# Patient Record
Sex: MALE | Race: OTHER
[De-identification: names, ages, dates, MRNs, and addresses within clinical notes are randomized per-mention and may not be internally consistent; named-entity substitution may affect disease eponyms.]

---

## 2022-08-23 ENCOUNTER — HOSPITAL ENCOUNTER (INPATIENT)
Dept: HOSPITAL 97 - ER | Age: 87
LOS: 2 days | Discharge: HOSPICE | DRG: 871 | End: 2022-08-25
Attending: INTERNAL MEDICINE | Admitting: INTERNAL MEDICINE
Payer: COMMERCIAL

## 2022-08-23 VITALS — BODY MASS INDEX: 23.5 KG/M2

## 2022-08-23 DIAGNOSIS — Z79.02: ICD-10-CM

## 2022-08-23 DIAGNOSIS — G20: ICD-10-CM

## 2022-08-23 DIAGNOSIS — A41.9: Primary | ICD-10-CM

## 2022-08-23 DIAGNOSIS — Z95.1: ICD-10-CM

## 2022-08-23 DIAGNOSIS — G30.9: ICD-10-CM

## 2022-08-23 DIAGNOSIS — F02.80: ICD-10-CM

## 2022-08-23 DIAGNOSIS — Z79.82: ICD-10-CM

## 2022-08-23 DIAGNOSIS — E78.5: ICD-10-CM

## 2022-08-23 DIAGNOSIS — Z79.899: ICD-10-CM

## 2022-08-23 DIAGNOSIS — I25.9: ICD-10-CM

## 2022-08-23 DIAGNOSIS — R79.89: ICD-10-CM

## 2022-08-23 DIAGNOSIS — I25.2: ICD-10-CM

## 2022-08-23 DIAGNOSIS — Z79.890: ICD-10-CM

## 2022-08-23 DIAGNOSIS — R65.21: ICD-10-CM

## 2022-08-23 DIAGNOSIS — Z20.822: ICD-10-CM

## 2022-08-23 DIAGNOSIS — Z51.5: ICD-10-CM

## 2022-08-23 DIAGNOSIS — Z95.5: ICD-10-CM

## 2022-08-23 DIAGNOSIS — J15.9: ICD-10-CM

## 2022-08-23 LAB
ALBUMIN SERPL BCP-MCNC: 3.1 G/DL (ref 3.4–5)
ALP SERPL-CCNC: 47 U/L (ref 45–117)
ALT SERPL W P-5'-P-CCNC: 15 U/L (ref 12–78)
AST SERPL W P-5'-P-CCNC: 19 U/L (ref 15–37)
BUN BLD-MCNC: 24 MG/DL (ref 7–18)
GLUCOSE SERPLBLD-MCNC: 161 MG/DL (ref 74–106)
HCT VFR BLD CALC: 39.6 % (ref 39.6–49)
INR BLD: 1.04
LYMPHOCYTES # SPEC AUTO: 0.5 K/UL (ref 0.7–4.9)
MAGNESIUM SERPL-MCNC: 1.8 MG/DL (ref 1.8–2.4)
MCV RBC: 91.1 FL (ref 80–100)
NT-PROBNP SERPL-MCNC: 344 PG/ML (ref ?–450)
PMV BLD: 8 FL (ref 7.6–11.3)
POTASSIUM SERPL-SCNC: 4.2 MMOL/L (ref 3.5–5.1)
RBC # BLD: 4.34 M/UL (ref 4.33–5.43)
TROPONIN I SERPL HS-MCNC: 378.3 PG/ML (ref ?–58.9)

## 2022-08-23 PROCEDURE — 87040 BLOOD CULTURE FOR BACTERIA: CPT

## 2022-08-23 PROCEDURE — 99284 EMERGENCY DEPT VISIT MOD MDM: CPT

## 2022-08-23 PROCEDURE — 80076 HEPATIC FUNCTION PANEL: CPT

## 2022-08-23 PROCEDURE — 83605 ASSAY OF LACTIC ACID: CPT

## 2022-08-23 PROCEDURE — 93306 TTE W/DOPPLER COMPLETE: CPT

## 2022-08-23 PROCEDURE — 96361 HYDRATE IV INFUSION ADD-ON: CPT

## 2022-08-23 PROCEDURE — 81003 URINALYSIS AUTO W/O SCOPE: CPT

## 2022-08-23 PROCEDURE — 71045 X-RAY EXAM CHEST 1 VIEW: CPT

## 2022-08-23 PROCEDURE — 87804 INFLUENZA ASSAY W/OPTIC: CPT

## 2022-08-23 PROCEDURE — 87086 URINE CULTURE/COLONY COUNT: CPT

## 2022-08-23 PROCEDURE — 36415 COLL VENOUS BLD VENIPUNCTURE: CPT

## 2022-08-23 PROCEDURE — 80048 BASIC METABOLIC PNL TOTAL CA: CPT

## 2022-08-23 PROCEDURE — 94640 AIRWAY INHALATION TREATMENT: CPT

## 2022-08-23 PROCEDURE — 85610 PROTHROMBIN TIME: CPT

## 2022-08-23 PROCEDURE — 97161 PT EVAL LOW COMPLEX 20 MIN: CPT

## 2022-08-23 PROCEDURE — 94760 N-INVAS EAR/PLS OXIMETRY 1: CPT

## 2022-08-23 PROCEDURE — 85025 COMPLETE CBC W/AUTO DIFF WBC: CPT

## 2022-08-23 PROCEDURE — 96375 TX/PRO/DX INJ NEW DRUG ADDON: CPT

## 2022-08-23 PROCEDURE — 97116 GAIT TRAINING THERAPY: CPT

## 2022-08-23 PROCEDURE — 84443 ASSAY THYROID STIM HORMONE: CPT

## 2022-08-23 PROCEDURE — 87811 SARS-COV-2 COVID19 W/OPTIC: CPT

## 2022-08-23 PROCEDURE — 96365 THER/PROPH/DIAG IV INF INIT: CPT

## 2022-08-23 PROCEDURE — 93005 ELECTROCARDIOGRAM TRACING: CPT

## 2022-08-23 PROCEDURE — 84145 PROCALCITONIN (PCT): CPT

## 2022-08-23 PROCEDURE — 97530 THERAPEUTIC ACTIVITIES: CPT

## 2022-08-23 PROCEDURE — 83735 ASSAY OF MAGNESIUM: CPT

## 2022-08-23 PROCEDURE — 83880 ASSAY OF NATRIURETIC PEPTIDE: CPT

## 2022-08-23 PROCEDURE — 84484 ASSAY OF TROPONIN QUANT: CPT

## 2022-08-23 PROCEDURE — 87088 URINE BACTERIA CULTURE: CPT

## 2022-08-23 PROCEDURE — 96366 THER/PROPH/DIAG IV INF ADDON: CPT

## 2022-08-23 RX ADMIN — Medication SCH ML: at 20:29

## 2022-08-23 RX ADMIN — PYRIDOSTIGMINE BROMIDE SCH MG: 60 TABLET ORAL at 20:29

## 2022-08-23 RX ADMIN — CEFTRIAXONE SCH MLS: 1 INJECTION, POWDER, FOR SOLUTION INTRAMUSCULAR; INTRAVENOUS at 17:12

## 2022-08-23 RX ADMIN — DONEPEZIL HYDROCHLORIDE SCH MG: 5 TABLET ORAL at 09:29

## 2022-08-23 RX ADMIN — ASPIRIN SCH MG: 81 TABLET, COATED ORAL at 09:29

## 2022-08-23 RX ADMIN — SODIUM CHLORIDE SCH MLS: 0.9 INJECTION, SOLUTION INTRAVENOUS at 09:29

## 2022-08-23 RX ADMIN — ROSUVASTATIN CALCIUM SCH MG: 10 TABLET, COATED ORAL at 20:28

## 2022-08-23 RX ADMIN — PYRIDOSTIGMINE BROMIDE SCH MG: 60 TABLET ORAL at 09:29

## 2022-08-23 RX ADMIN — Medication SCH ML: at 09:29

## 2022-08-23 RX ADMIN — IPRATROPIUM BROMIDE SCH MG: 0.5 SOLUTION RESPIRATORY (INHALATION) at 20:50

## 2022-08-23 RX ADMIN — DONEPEZIL HYDROCHLORIDE SCH MG: 5 TABLET ORAL at 20:29

## 2022-08-23 RX ADMIN — SODIUM CHLORIDE SCH MLS: 0.9 INJECTION, SOLUTION INTRAVENOUS at 13:35

## 2022-08-23 RX ADMIN — QUETIAPINE SCH MG: 25 TABLET ORAL at 20:28

## 2022-08-23 RX ADMIN — ENOXAPARIN SODIUM SCH MG: 40 INJECTION SUBCUTANEOUS at 09:29

## 2022-08-23 RX ADMIN — LEVOTHYROXINE SODIUM SCH MG: 0.05 TABLET ORAL at 09:45

## 2022-08-23 RX ADMIN — IPRATROPIUM BROMIDE SCH MG: 0.5 SOLUTION RESPIRATORY (INHALATION) at 16:05

## 2022-08-23 NOTE — EDPHYS
Physician Documentation                                                                           

 [facility]                                                                 

Name: Liang Jacobs                                                                            

Age: 89 yrs                                                                                       

Sex: Male                                                                                         

: 1933                                                                                   

MRN: [id number]                                                                                   

Arrival Date: 2022                                                                          

Time: 04:30                                                                                       

Account#: [id number]                                                                            

Bed 4                                                                                             

Private MD:                                                                                       

ED Physician Michael Ngo                                                                      

HPI:                                                                                              

                                                                                             

05:11 This 89 yrs old Other Male presents to ER via Wheelchair with complaints of Shortness   carloz 

      Of Breath, Low Blood Pressure.                                                              

05:11 The patient has shortness of breath at rest, with light activity. Onset: The            carloz 

      symptoms/episode began/occurred 2 day(s) ago. Duration: The symptoms are continuous,        

      and are steadily getting worse. The patient's shortness of breath is aggravated by          

      coughing, light activity, supine position. Associated signs and symptoms: Pertinent         

      positives:. Severity of symptoms: At their worst the symptoms were mild in the              

      emergency department the symptoms are unchanged. The patient has experienced similar        

      episodes in the past, a few times.                                                          

                                                                                                  

Historical:                                                                                       

- Allergies:                                                                                      

04:53 No Known Allergies;                                                                     lp1 

- Home Meds:                                                                                      

04:53 aspirin 81 mg Oral TbEC 1 tab once daily [Active]; clopidogrel 75 mg oral tab 1 tab     lp1 

      once daily [Active]; donepezil 10 mg oral tab 1 tab once daily [Active]; levothyroxine      

      50 mcg tab 1 tab once daily [Active]; Claritin 10 mg Oral tab 1 tab once daily              

      [Active]; pyridostigmine bromide 60 mg oral tab [Active]; Seroquel 25 mg Oral tab           

      [Active]; rosuvastatin 20 mg oral tab 1 tab once daily [Active];                            

- PMHx:                                                                                           

04:53 Myocardial infarction; Hyperlipidemia; Dementia;                                        lp1 

- PSHx:                                                                                           

04:53 Heart stents; Cardiac bypass;                                                           lp1 

                                                                                                  

- Immunization history:: Adult Immunizations up to date.                                          

- Social history:: Smoking status: Patient denies any tobacco usage or history of.                

                                                                                                  

                                                                                                  

ROS:                                                                                              

05:12 Eyes: Negative for injury, pain, redness, and discharge, ENT: Negative for injury,      carloz 

      pain, and discharge, Neck: Negative for injury, pain, and swelling, Cardiovascular:         

      Negative for chest pain, palpitations, and edema, Abdomen/GI: Negative for abdominal        

      pain, nausea, vomiting, diarrhea, and constipation, Back: Negative for injury and pain,     

      : Negative for injury, bleeding, discharge, and swelling, MS/Extremity: Negative for      

      injury and deformity, Neuro: Negative for headache, weakness, numbness, tingling, and       

      seizure, Psych: Negative for depression, anxiety, suicide ideation, homicidal ideation,     

      and hallucinations, Allergy/Immunology: Negative for hives, rash, and allergies,            

      Endocrine: Negative for neck swelling, polydipsia, polyuria, polyphagia, and marked         

      weight changes, Hematologic/Lymphatic: Negative for swollen nodes, abnormal bleeding,       

      and unusual bruising.                                                                       

05:12 Constitutional: Positive for chills, fatigue, fever, malaise, poor PO intake.               

05:12 Respiratory: Positive for cough, with no reported sputum.                                   

05:12 Skin: Positive for pallor.                                                                  

                                                                                                  

Exam:                                                                                             

05:15 Eyes:  Pupils equal round and reactive to light, extra-ocular motions intact.  Lids and carloz 

      lashes normal.  Conjunctiva and sclera are non-icteric and not injected.  Cornea within     

      normal limits.  Periorbital areas with no swelling, redness, or edema.                      

05:15 Constitutional: The patient appears lethargic, in obvious distress, mildly distressed,      

      moderately distressed.                                                                      

05:15 Cardiovascular: Rate: tachycardic, Rhythm: regular, Pulses: Pulses are 4+ in bilateral      

      radial, brachial, femoral, popliteal, posterior tibial and and dorsalis pedis               

      arteries.. Heart sounds: normal, normal S1and S2, no S3 or S4, murmur, systolic, grade      

      2 over 6, Edema: is not appreciated, JVD: is not appreciated.                               

07:06 ECG was reviewed by the Attending Physician.                                            [facility] 

                                                                                                  

Vital Signs:                                                                                      

04:51  / 97; Pulse 106; Resp 26; Temp 96.8(A); Pulse Ox 86% on R/A; Weight 56.7 kg (R); lp1 

05:36 Pulse 100; Resp 24; Pulse Ox 100% on 2 lpm NC;                                          tw5 

06:30  / 78 (man/);                                                                     vc1 

07:50  / 85; Pulse 106; Resp 18; Temp 100.4(C);                                         ph  

07:56 BP 85 / 54; Pulse 104; Resp 20; Temp 100.7; Pulse Ox 100% on 2 lpm NC;                  ph  

09:00  / 80; Pulse 102; Resp 21; Temp 99.9(C); Pulse Ox 97% on R/A;                     ph  

10:00  / 90; Pulse 93; Resp 20; Temp 99.6(C); Pulse Ox 99% on R/A;                      ph  

                                                                                                  

MDM:                                                                                              

04:39 Patient medically screened.                                                             carloz 

05:16 Differential diagnosis: Anemia CHF exacerbation, obstructed airway, bronchitis, flu,    carloz 

      pneumonia, Pneumothorax reactive airway disease, Sepsis Unstable Angina. Antibiotic         

      administration: Rocephin and Zithromax given. Differential Diagnosis altered mental         

      status, sepsis, flu. Differential Diagnosis: electrolyte abnormality, hypoglycemia,         

      pneumonia, seizure, sepsis, UTI, volume depletion. The patient's Wells Deep Vein            

      Thrombosis Score was calculated as follows: Heart Rate >100 BPM (1.5 Pts) Imm/Surg in       

      last 4 wks (1.5 Pts) Total Score: 3-6 Pts - Mod Risk. The patient's pulmonary embolism      

      risk score was calculated as follows: the patients heart rate is greater than 100 beats     

      per minute (1.5 Pts) patient has experienced immobilization or surgery in the last four     

      weeks (1.5 Pts) Total Score: 3-6 points. This patient was found to be at moderate risk      

      for a pulmonary embolism by using the Well's assessment criteria. Immunization status:      

      Pneumococcal vaccine: Influenza vaccine: Data reviewed: vital signs, nurses notes,          

      diagnostic data from outside facility, old medical records, lab test result(s), EKG,        

      radiologic studies, plain films. Data interpreted: Cardiac monitor: rate is 106             

      beats/min, rhythm is regular, Pulse oximetry: on room air is 86 %. Test interpretation:     

      by ED physician or midlevel provider: ECG, plain radiologic studies. Counseling: I had      

      a detailed discussion with the patient and/or guardian regarding: the historical            

      points, exam findings, and any diagnostic results supporting the discharge/admit            

      diagnosis, lab results, radiology results, the need for further work-up and treatment       

      in the hospital.                                                                            

                                                                                                  

                                                                                             

04:38 Order name: Basic Metabolic Panel; Complete Time: 07:10                                                                                                                              

04:38 Order name: CBC with Diff; Complete Time: 06:40                                         carloz 

                                                                                             

04:38 Order name: LFT's; Complete Time: 07:10                                                                                                                                              

04:38 Order name: Magnesium; Complete Time: 07:10                                                                                                                                          

04:38 Order name: NT PRO-BNP; Complete Time: 07:10                                                                                                                                         

04:38 Order name: PT-INR; Complete Time: 06:40                                                [facility] 

                                                                                             

04:38 Order name: Troponin HS; Complete Time: 07:10                                           [facility] 

                                                                                             

04:38 Order name: Blood Culture Adult (2)                                                     [facility] 

                                                                                             

04:38 Order name: Lactate; Complete Time: 07:10                                               [facility] 

                                                                                             

04:38 Order name: Procalcitonin; Complete Time: 07:10                                         [facility] 

                                                                                             

04:38 Order name: Urine Culture                                                               [facility] 

                                                                                             

04:38 Order name: SARS RAPID; Complete Time: 06:40                                            [facility] 

                                                                                             

04:38 Order name: Flu; Complete Time: 06:40                                                   [facility] 

                                                                                             

07:03 Order name: Urine Dipstick-Ancillary; Complete Time: 07:10                              [facility]

                                                                                             

04:38 Order name: XRAY Chest (1 view)                                                         [facility] 

                                                                                             

04:38 Order name: EKG; Complete Time: 04:39                                                   [facility] 

                                                                                             

04:38 Order name: Cardiac monitoring; Complete Time: 05:02                                    [facility] 

                                                                                             

04:38 Order name: EKG - Nurse/Tech; Complete Time: 05:11                                      [facility] 

                                                                                             

04:38 Order name: IV Saline Lock; Complete Time: 05:11                                        [facility] 

                                                                                             

04:38 Order name: Labs collected and sent; Complete Time: 05:11                               [facility] 

                                                                                             

04:38 Order name: O2 Per Protocol; Complete Time: 05:02                                       [facility] 

                                                                                             

09:50 Order name: Lactate Sepsis 2 HR Follow-up                                               [facility]

                                                                                             

10:26 Order name: Thyroid Stimulating Hormone                                                 [facility]

                                                                                             

04:38 Order name: O2 Sat Monitoring; Complete Time: 05:02                                     [facility] 

                                                                                             

04:38 Order name: Urine Dipstick-Ancillary (obtain specimen); Complete Time: 07:49            [facility] 

                                                                                             

05:19 Order name: IV Saline Lock - Large Bore; Complete Time: 07:45                           [facility] 

                                                                                             

06:39 Order name: Donnelly; Complete Time: 07:44                                                 [facility] 

                                                                                                  

EC:06 Rate is 106 beats/min. Rhythm is regular. QRS Axis is Normal. MI interval is normal.    [facility] 

      QRS interval is normal. QT interval is normal. T waves are Normal. No ST changes noted.     

      Clinical impression: Sinus tachycardia and No evidence of ischemia. Interpreted by me.      

      Reviewed by me.                                                                             

                                                                                                  

Administered Medications:                                                                         

05:17 Drug: Rocephin (cefTRIAXone) 2 grams Route: IV; Rate: per protocol; Site: right         [facility] 

      antecubital;                                                                                

05:18 Drug: NS 0.9% 500 ml Route: IV; Rate: bolus; Site: right antecubital;                   [facility] 

05:18 Drug: NS 0.9% 1000 ml Route: IV; Rate: 125 ml/hr; Site: right antecubital;              [facility] 

05:18 Drug: Zithromax (azithromycin) 500 mg Route: IVPB; Infused Over: 1 hrs; Site: right     4 

      antecubital;                                                                                

05:18 Drug: Pepcid (famotidine) 20 mg Route: IVP; Site: right antecubital;                    [facility] 

07:45 Drug: NS 0.9% 1000 ml Route: IV; Rate: 1 bolus; Site: right antecubital;                ph  

09:00 Follow up: Response: No adverse reaction; IV Status: Completed infusion; IV Intake:     ph  

      1000ml                                                                                      

08:19 Drug: vancoMYCIN 1 grams Route: IVPB; Infused Over: 2 hrs; Site: right antecubital;     ph  

10:20 Follow up: Response: No adverse reaction; IV Status: Completed infusion; IV Intake:     ph  

      250ml                                                                                       

                                                                                                  

                                                                                                  

Disposition:                                                                                      

07:08 Critical Care:.                                                                         carloz 

                                                                                                  

Disposition Summary:                                                                              

22 05:22                                                                                    

Hospitalization Ordered                                                                           

      Hospitalization Status: Inpatient Admission                                             carloz 

      Provider: ROLY Jacobs cha 

      Condition: Fair                                                                         carloz 

      Problem: new                                                                            carloz 

      Symptoms: have improved                                                                 carloz 

      Bed/Room Type: Standard                                                                 carloz 

      Location: Intensive Care Unit(22 12:12)                                           bd  

      Room Assignment: 2-(22 12:12)                                                     bd  

      Diagnosis                                                                                   

        - Lobar pneumonia, unspecified organism - left lower lobe                             carloz 

        - Hypoxemia                                                                           carloz 

        - Dementia in other diseases classified elsewhere without behavioral disturbance      carloz 

        - Altered mental status, unspecified                                                  carloz 

        - Dyspnea                                                                             carloz 

        - Hypothermia, not associated with low environmental temperature                      carloz 

        - Sepsis, unspecified organism                                                        carloz 

        - Acute kidney failure, unspecified                                                   carloz 

      Forms:                                                                                      

        - Medication Reconciliation Form                                                      carloz 

        - SBAR form                                                                           carloz 

Critical care time excluding procedures:                                                          

07:08 Critical care time: Bedside Care: 20 minutes, Consultation: 10 minutes, Family          carloz 

      Intervention: 10 minutes. Total time: 40 minutes                                            

                                                                                                  

Signatures:                                                                                       

Dispatcher [employer]                           Serenity Gibson Corey, MD MD cha Smirch, Shelby, RN                      RN                                                      

Hermila Lovell RN                         RN   lp                                                  

Castillo, Shellie, RN                      RN   [facility], [place], RN                       RN   [id number]                                                  

                                                                                                  

Corrections: (The following items were deleted from the chart)                                    

06:39 05:20 Misc. Order ordered. [facility] 

 05:22 Telemetry/MedSurg (Inpatient) [facility]  

 05:22 [facility]  

12:12 11:52 [facility] ER HOLD ss                                                                   bd  

12: 11:52 ERHOLD- ss                                                                        bd  

                                                                                                  

**************************************************************************************************

## 2022-08-23 NOTE — XMS REPORT
Continuity of Care Document

                           Created on:2022



Patient:MARTA FIELDS

Sex:Male

:1933

External Reference #:[id number]





Demographics







                          Address                   [address]



                                                    [place] [address]

 

                          Home Phone                [phone]

 

                          Work Phone                [phone]

 

                          Email Address             NONE

 

                          Preferred Language        English

 

                          Marital Status            Unknown

 

                          Mormonism Affiliation     Unknown

 

                          Race                      Unknown

 

                          Additional Race(s)        Unavailable

 

                          Ethnic Group              Unknown









Author







                          Organization              [facility]

t

 

                          Address                   [address]



                                                    [place] [address]

 

                          Phone                     [phone]









Support







                Name            Relationship    Address         Phone

 

                NITHIN FIELDS                [address] [phone]



                                                [place] [address] 

 

                ELLIOT FIELDS CH              Unavailable     [phone]









Care Team Providers







                    Name                Role                Phone

 

                    DINAH ZIMMERCAROLA Attending Clinician Unavailable

 

                    ROBERT RENDON Attending Clinician [phone]

 

                    NURSE, NURSE        Attending Clinician Unavailable

 

                    MEHDI PIERSON     Attending Clinician [phone]

 

                    SALMA GALLEGOS    Attending Clinician Unavailable

 

                    [facility], PROVIDER Attending Clinician Unavailable

 

                    TERESA HERNANDEZ       Attending Clinician [phone]









Problems

This patient has no known problems.



Allergies, Adverse Reactions, Alerts

This patient has no known allergies or adverse reactions.



Social History







           Social Habit Start Date Stop Date  Quantity   Comments   Source

 

           Health-related Behavior 2019 00:00:00                          

        [facility]

 

           Alcohol intake                                             [facility]

th

 

           Sex Assigned At Birth                       Male                  Acc

[facility]









                Smoking Status  Start Date      Stop Date       Source

 

                Unknown if ever smoked                                 [facility]







Medications







       Ordered Filled Start  Stop   Current Ordering Indication Dosage Frequency

 Signature

                    Comments            Components          Source



     Medication Medication Date Date Medication? Clinician                (SIG) 

          



     Name Name                                                   

 

     Plavix 75      2019      No                       take 1       A

[facility]



     mg tablet      2-30                               tablet (75 9         ealt

h



               00:00:                               mg) by [facility]      



               00                                 oral route n         



                                                  once daily refilled      



                                                       during      



                                                       OV.--AG,R      



                                                       N         

 

     Toprol XL      2019      No                       take 1       A

[facility]



     50 mg      2-30                               tablet (50 9         ealth



     tablet,exte      00:00:                               mg) by [facility]     

 



     nded      00                                 oral route n         



     release                                         once daily refilled      



                                                       during      



                                                       OV.--AG,R      



                                                       N         

 

     aspirin 81      2019-1      No                       take 1       

[facility]



     mg        2-30                               tablet (81 9         ealth



     tablet,andre      00:00:                               mg) by Medic[facility]     

 



     yed release      00                                 oral route n         



                                                  once daily refilled      



                                                       during      



                                                       OV.--AG,R      



                                                       N         

 

     Crestor 20      2019      No                       take 1       

[facility]



     mg tablet      2-30                               tablet (20 9         ealt

h



               00:00:                               mg) by Medicatio      



               00                                 oral route n         



                                                  once daily refilled      



                                                       during      



                                                       OV.--AG,R      



                                                       N         

 

     donepezil 5      2019      No                       take 1      

 [facility]



     mg tablet      2-30                               tablet (5 9         ealth



               00:00:                               mg) by Medicatio      



               00                                 oral route n         



                                                  once daily refilled      



                                                  in the during      



                                                  evening OV.--AG,R      



                                                       N         

 

     levothyroxi      2019      No                       take 1      

 [facility]



     ne 50 mcg      2-30                               tablet by 9         ealth



     tablet      00:00:                               Oral route [facility]      



               00                                 1 time per n         



                                                  day  refilled      



                                                       during      



                                                       OV.--AG,R      



                                                       N         

 

     pyridostigm      2019      No                       take 1      

 [facility]



     ine bromide      2-30                               tablet (60 9         ea

lth



     60 mg      00:00:                               mg) by [facility]      



     tablet      00                                 oral route n         



                                                  4 times refilled      



                                                  per day during      



                                                       OV.--AGR      



                                                       N         

 

     pyridostigm      2019- No                       take 1           Acc

[facility]



     ine bromide      2-16 -30                          tablet (60           e

alth



     60 mg      00:00: 00:00                          mg) by           



     tablet      00   :00                           oral route           



                                                  4 times           



                                                  per day           

 

     levothyroxi      2019- No                       take 1           Acc

[facility]



     ne 50 mcg      6-30                          tablet by           ealt

h



     tablet      00:00: 00:00                          Oral route           



               00   :00                           1 time per           



                                                  day            

 

     Plavix 75      2019- No                       take 1           [facility]

[facility]



     mg tablet                                tablet (75           eal

th



               00:00: 00:00                          mg) by           



               00   :00                           oral route           



                                                  once daily           

 

     Toprol XL      2019- No                       take 1           [facility]

[facility]



     50 mg                                tablet (50           ealth



     tablet,exte      00:00: 00:00                          mg) by           



     nded      00   :00                           oral route           



     release                                         once daily           

 

     aspirin 81      2019- No                       take 1           [facility]

[facility]



     mg                                  tablet (81           ealth



     tablet,andre      00:00: 00:00                          mg) by           



     yed release      00   :00                           oral route           



                                                  once daily           

 

     Crestor 20      2019- No                       take 1           [facility]

[facility]



     mg tablet                                tablet (20           eal

th



               00:00: 00:00                          mg) by           



               00   :00                           oral route           



                                                  once daily           

 

     donepezil 5      2019- No                       take 1           Acc

[facility]



     mg tablet                                tablet (5           ealt

h



               00:00: 00:00                          mg) by           



               00   :00                           oral route           



                                                  once daily           



                                                  in the           



                                                  evening           

 

     pyridostigm                             take 1           Acc

[facility]



     ine bromide                                tablet (60           e

alth



     60 mg      00:00: 00:00                          mg) by           



     tablet      00   :00                           oral route           



                                                  4 times           



                                                  per day           







Immunizations







           Ordered    Filled     Date       Status     Comments   Source



           Immunization Name Immunization Name                                  

 

           Influenza             2018  Completed  Note: FLU VACC 4 [facility]

alth



                                 4                     SU 3 YRS PLUS IM 



                                 00:00:00              By ALEX ; 



                                                       Source: New 



                                                       Immunization 



                                                       Record     

 

           Influenza             2016  Completed  Note: FLU VACC 4 [facility]

alth



                                 8                     SU 3 YRS PLUS IM 



                                 00:00:00              By CHERI ; 



                                                       Source: New 



                                                       Immunization 



                                                       Record     







Vital Signs







             Vital Name   Observation Time Observation Value Comments     Source

 

             Body height  2019 15:28:00 162.56 cm                 [facility]

[facility]

 

             Patient Body Weight 2019 15:28:00 69.853 kg                 A

[facility]

 

             Intravascular Systolic 2019 15:28:00 146 mm[Hg]              

  [facility]

 

             Intravascular Diastolic 2019 15:28:00 58 mm[Hg]              

   [facility]

 

             Heart Beat   2019 15:28:00 76 /min                   [facility]

[facility]

 

             Body Temperature 2019 15:28:00 36.61 [place]                 [facility]

 

             Respiratory Rate 2019 15:28:00 20 /min                   [facility]

 

             Body mass index 2019 15:28:00 26.43 kg/m2               [facility]

[facility]

 

             Intravascular Systolic 2019 12:20:00 142 mm[Hg]              

  [facility]

 

             Intravascular Diastolic 2019 12:20:00 78 mm[Hg]              

   [facility]

 

             Body height  2019 11:21:00 64.00 [in_us]              [facility]

[facility]

 

             Patient Body Weight 2019 11:21:00 150.00 [lb_av]             

 [facility]

 

             Intravascular Systolic 2019 11:21:00 143 mm[Hg]              

  [facility]

 

             Intravascular Diastolic 2019 11:21:00 69 mm[Hg]              

   [facility]

 

             Heart Beat   2019 11:21:00 68 /min                   [facility]

[facility]

 

             Body Temperature 2019 11:21:00 98.40 [degF]              [facility]

 

             Respiratory Rate 2019 11:21:00 18 /min                   [facility]

 

             Body mass index 2019 11:21:00 25.70 kg[facility]m2               [facility]

 

             Body height  2018 14:53:00 64.00 [in_us]              [facility]

 

             Patient Body Weight 2018 14:53:00 145.00 [lb_av]             

 [facility]

 

             Intravascular Systolic 2018 14:53:00 116 mm[Hg]              

  [facility]

 

             Intravascular Diastolic 2018 14:53:00 51 mm[Hg]              

   [facility]

 

             Heart Beat   2018 14:53:00 63 /min                   [facility]

[facility]

 

             Body Temperature 2018 14:53:00 97.90 [degF]              [facility]

 

             Respiratory Rate 2018 14:53:00 18 /min                   [facility]

 

             Body mass index 2018 14:53:00 24.90 kg/m2               [facility]

 

             Body height  2018 08:54:00 64.00 [in_us]              [facility]

 

             Patient Body Weight 2018 08:54:00 148.00 [lb_av]             

 [facility]

 

             Intravascular Systolic 2018 08:54:00 132 mm[Hg]              

  [facility]

 

             Intravascular Diastolic 2018 08:54:00 77 mm[Hg]              

   [facility]

 

             Heart Beat   2018 08:54:00 70 /min                   [facility]

[facility]

 

             Body Temperature 2018 08:54:00 96.90 [degF]              [facility]

 

             Respiratory Rate 2018 08:54:00 18 /min                   [facility]

 

             Body mass index 2018 08:54:00 25.40 kg/m2               [facility]

 

             Body height  2017-07-10 12:48:00 64.00 [in_us]              [facility]

 

             Patient Body Weight 2017-07-10 12:48:00 143.20 [lb_av]             

 [facility]

 

             Intravascular Systolic 2017-07-10 12:48:00 135 mm[Hg]              

  [facility]

 

             Intravascular Diastolic 2017-07-10 12:48:00 60 mm[Hg]              

   [facility]

 

             Heart Beat   2017-07-10 12:48:00 73 /min                   [facility]

[facility]

 

             Body Temperature 2017-07-10 12:48:00 97.10 [degF]              [facility]

 

             Respiratory Rate 2017-07-10 12:48:00 18 /min                   [facility]

 

             Body mass index 2017-07-10 12:48:00 24.60 kg/m2               [facility]

 

             Intravascular Systolic 2016 14:39:00 128 mm[Hg]              

  [facility]

 

             Intravascular Diastolic 2016 14:39:00 72 mm[Hg]              

   [facility]

 

             Body height  2016 14:18:00 64.00 [in_us]              [facility]

 

             Patient Body Weight 2016 14:18:00 148.20 [lb_av]             

 [facility]

 

             Intravascular Systolic 2016 14:18:00 149 mm[Hg]              

  [facility]

 

             Intravascular Diastolic 2016 14:18:00 63 mm[Hg]              

   [facility]

 

             Heart Beat   2016 14:18:00 66 /min                   [facility]

[facility]

 

             Body Temperature 2016 14:18:00 97.70 [degF]              [facility]

 

             Respiratory Rate 2016 14:18:00 18 /min                   [facility]

 

             Body mass index 2016 14:18:00 25.40 kg/m2               [facility]

 

             Body height  2016 10:15:00 64.00 [in_us]              [facility]

 

             Patient Body Weight 2016 10:15:00 155.40 [lb_av]             

 [facility]

 

             Intravascular Systolic 2016 10:15:00 125 mm[Hg]              

  [facility]

 

             Intravascular Diastolic 2016 10:15:00 55 mm[Hg]              

   [facility]

 

             Heart Beat   2016 10:15:00 63 /min                   [facility]

[facility]

 

             Body Temperature 2016 10:15:00 97.10 [degF]              [facility]

[facility]

 

             Respiratory Rate 2016 10:15:00 18 /min                   [facility]

[facility]

 

             Body mass index 2016 10:15:00 26.70 kg/m2               [facility]

[facility]







Procedures







                Procedure       Date / Time Performed Performing Clinician [facility]

e

 

                GLYCOSYLATED HEMOGLOBIN TEST 2019 00:00:00                

 [facility]

 

                ASSAY THYROID STIM HORMONE 2019 00:00:00                 A

[facility]

 

                OFFICE/OUTPATIENT VISIT EST 2019 00:00:00                 

[facility]







Encounters







        Start   End     Encounter Admission Attending Care    Care    Encounter 

Source



        Date/Time Date/Time Type    Type    Clinicians Facility Department ID   

   

 

        2020 Outpatient E       ZIMMER, [facility]    MED     [id number]  

  [facility]



        10:55:00 22:17:00                 [place]                            

 

        2019 OFFICE/OUT         JUSTICE, [facility]    [id number] 

[id number] [facility]



        15:30:00 15:30:00 PATIENT         ROBERT         [id number]-

9 ealth



                        VISIT EST                         [id number]

 



                                                        0f4     [id number]  

 

        2019 Outpatient         NURSE,  [facility]    [id number]

[id number] Access



        13:17:00 13:17:00                 NURSE           [id number]

 ealt



                                                        [id number] 



                                                        354     [id number]  

 

        2019 Outpatient         DANGELO, [facility]    [id number]

[id number] [facility]



        00:00:00 00:00:00                 GARRICKRI         [id number]

 ealt



                                                        [id number] 



                                                        0f4     [id number]  

 

        2019 Outpatient         DANGELO, [facility]    [id number]

[id number] [facility]



        00:00:00 00:00:00                 GARRICKRI         [id number]

 ealt



                                                        [id number] 



                                                        0f4     [id number]  

 

        2019 Outpatient         DANGELO, [facility]    [id number]

[id number] [facility]



        00:00:00 00:00:00                 GAYTARI         [id number]

 ealth



                                                        [id number] 



                                                        0f4     [id number]  

 

        2019 Outpatient         EL, [facility]    [id number]

[id number] [facility]



        00:00:00 00:00:00                 MEHJABIN         [id number]-

9 ealth



                                                        [id number] 



                                                        0f4     [id number]  

 

        2019 Outpatient         DANGELO, [facility]    [id number]

[id number] [facility]



        11:21:00 11:21:00                 GAYTARI         [id number] [id number]

 ealth



                                                        [id number] 



                                                        0f4     [id number]  

 

        2019 Outpatient         [facility]    117

[id number] [facility]



        00:00:00 00:00:00                 H, PROVIDER                         ea

lth

 

        2019 Outpatient         [facility]    [id number]

 [id number] 

[facility]



        00:00:00 00:00:00                 H, PROVIDER         [id number] [id number]

a8-8 ealth



                                                        [id number] [id number] 



                                                        0f4     [id number]  

 

        2019 Outpatient         EL, [facility]    [id number]

[id number] [facility]



        00:00:00 00:00:00                 MEHJABIN         [id number] [id number]-

8 ealth



                                                        [id number] 



                                                        0f4     [id number]  

 

        2019 Outpatient         MARY,  [facility]    [id number]

[id number] [facility]



        00:00:00 00:00:00                 TERESA          [id number] [id number]

 ealth



                                                        [id number] 



                                                        0f4     [id number]  

 

        2019 Outpatient         [facility]    117

[id number] [facility]



        00:00:00 00:00:00                 H, PROVIDER                         carlo

[facility]

 

        2019 Outpatient         [facility]    [id number]

 [id number] 

[facility]



        00:00:00 00:00:00                 H, PROVIDER         [id number] [id number]

5d-a ealt



                                                        [id number] 



                                                        0f4     [id number]  

 

        2018 Outpatient         DANGELO, [facility]    [id number]

[id number] [facility]



        14:53:00 14:53:00                 GAYTARI         e2-0io0-14k [id number]

 ealth



                                                        [id number] 



                                                        0f4     [id number]  

 

        2018 Outpatient         [facility]    117

[id number] Access



        00:00:00 00:00:00                 H, PROVIDER                         carlo

[facility]

 

        2018 Outpatient         [facility]    [id number]

 [id number] 

[facility]



        00:00:00 00:00:00                 H, PROVIDER         e2-5lt4-43s 5aa-44

42-9 ealth



                                                        [id number] 



                                                        0f4     [id number]  

 

        2018 Outpatient         EL, [facility]    [id number]

[id number] [facility]



        00:00:00 00:00:00                 MEHJABIN         [id number] fe2-4077-

b ealth



                                                        [id number] 



                                                        0f4     5c83ba  

 

        2018-07-10 2018-07-10 Outpatient         DANGELO, [facility]    9ns8p5s3-6r f0f

3s756-9 [facility]



        00:00:00 00:00:00                 GAYTARI         e2-5cg6-00f 867-4400-b

 ealth



                                                        0-vz3qab109 87e-42fc41 



                                                        0f4     63dc3a  

 

        2018 Outpatient         DANGELO, [facility]    8fu3q6n1-0s 426

83z9l-2 [facility]



        00:00:00 00:00:00                 GAYTARI         e2-7ct2-73p 043-4e4c-9

 ealth



                                                        0-xh9ofh279 00f-u71334 



                                                        0f4     f6a9c4  

 

        2018 Outpatient         DANGELO, [facility]    9pr7j6h9-6m 00a

8893c-8 [facility]



        00:00:00 00:00:00                 GAYTARI         e2-7ke8-44z 91a-4403-9

 ealt



                                                        0-aj2xzg417 39b-6m1975 



                                                        0f4     91e23c  

 

        2018 Outpatient         [facility]    117

6115 Access



        00:00:00 00:00:00                 H, PROVIDER                         carlo leblanc

 

        2018 Outpatient         [facility]    8tf5c6p9-7l

 w77m25qm-9 

Access



        00:00:00 00:00:00                 H, PROVIDER         e2-3ul9-45d 956-47

6d-8 ealt



                                                        0-jg6ffh933 3df-4ccbc1 



                                                        0f4     [id number]  

 

        2018 Outpatient         DANGELO, [facility]    6gc3o5r1-3t 568

0f26v-9 [facility]



        00:00:00 00:00:00                 IMMANUELTARI         e2-3gh1-36i 101-4860-9

 ealt



                                                        0-um0qaf057 e69-08jn1h 



                                                        0f4     lc160z  

 

        2018 Outpatient         EL, [facility]    1rf1n5t0-5q da9

sy308-2 [facility]



        00:00:00 00:00:00                 SALMA         e2-3im6-49a r84-6329-

b ealth



                                                        0-eq2shn155 731-17acb2 



                                                        0f4     d79dd4  

 

        2018 Outpatient         [facility]    117

6117 [facility]



        00:00:00 00:00:00                 H, PROVIDER                         carlo

[facility]

 

        2018 Outpatient         [facility]    0tp7c8a7-2z

 1i06o743-o 

[facility]



        00:00:00 00:00:00                 H, PROVIDER         e2-6gc9-82s 8e1-4a

ef-8 ealth



                                                        0-yn0kwx615 bf5-422d3c 



                                                        0f4     840f87  

 

        2018 Outpatient         DANGELO, [facility]    2ex7v2d9-1x 4ef

2x655-1 [facility]



        00:00:00 00:00:00                 GAYTARI         e2-0pt7-94o 1ed-4db7-b

 ealth



                                                        0-qd8kjg696 99b-44p404 



                                                        0f4     p1h441  

 

        2018 Outpatient         [facility]    117

6127 [facility]



        00:00:00 00:00:00                 H, PROVIDER                         carlo

[facility]

 

        2018 Outpatient         PARKAR, HC    9oc8a7x6-8n 3ca

94524-9 [facility]



        00:00:00 00:00:00                 MEHJABIN         e2-1dg4-53d 1t9-3e6c-

9 ealth



                                                        0-uq1dgg404 511-7q909a 



                                                        0f4     561650  

 

        2018 Outpatient         DANGELO, [facility]    8xg0t7c5-3o bda

b6567-k [facility]



        00:00:00 00:00:00                 GAYTARI         e2-5rk4-05e c16-1027-l

 ealth



                                                        0-ds4dom835 dc2-c3a44b 



                                                        0f4     2aae67  

 

        2018 Outpatient         [facility]    9pb4f1s0-7e

 [id number] 

[facility]



        00:00:00 00:00:00                 H, PROVIDER         e2-7jh4-25b a4d-4a

8f-a ealth



                                                        0-pp5wfg982 59c-9x129z 



                                                        0f4     a69ad6  

 

        2018 Outpatient         DANGELO, [facility]    6pt2c9j2-2w efb

6v8p2-3 [facility]



        10:13:00 10:13:00                 GAYTARI         e2-9pb1-50c 8fd-4492-9

 ealth



                                                        0-jb6zsq586 7ad-6b94d5 



                                                        0f4     cabbe1  

 

        2018 Outpatient         [facility]    117

6122 [facility]



        00:00:00 00:00:00                 H, PROVIDER                         carlo

[facility]

 

        2018 Outpatient         [facility]    2jg8b5a8-4n

 242r3j39-9 

[facility]



        00:00:00 00:00:00                 H, PROVIDER         e2-6lh0-55v ac7-43

3f-a ealth



                                                        0-nu1ufq653 bcf-wvn826 



                                                        0f4     10d4f1  

 

        2018 Outpatient         EL, [facility]    7zl0h0x3-8f 84e

ccec8-f [facility]



        00:00:00 00:00:00                 SALMA         e2-0lm5-78d fa2-43bb-

b ealt



                                                        0-ar0mde109 12e-2362bf 



                                                        0f4     ae6b44  

 

        2017 Outpatient         [facility]    117

6128 [facility]



        00:00:00 00:00:00                 H, PROVIDER                         carlo

[facility]

 

        2017 Outpatient         [facility]    8df9d5b3-5r

 v5p33r09-c 

[facility]



        00:00:00 00:00:00                 H, PROVIDER         e2-8bh1-83j ddf-4a

a1-8 ealth



                                                        0-bj3atm736 72d-di8093 



                                                        0f4     6v851q  

 

        2017-07-10 2017-07-10 Outpatient         DANGELO, [facility]    2id5i5k6-7p b54

8n62v-b [facility]



        12:48:00 12:48:00                 GAYTARI         e2-4nf7-28i baf-4dc8-a

 ealth



                                                        0-up2aus035 j99-3p9as3 



                                                        0f4     2a70cf  

 

        2017-07-10 2017-07-10 Outpatient         [facility]    117

6116 [facility]



        00:00:00 00:00:00                 H, PROVIDER                         carlo gibson

 

        2017-07-10 2017-07-10 Outpatient         [facility]    0mk4h5j5-5w

 [id number] 

[facility]



        00:00:00 00:00:00                 H, PROVIDER         e2-7mx5-76c 621-47

6d-a ealt



                                                        0-ew2myg766 37f-e26fb7 



                                                        0f4     l2g508  

 

        2017-07-10 2017-07-10 Outpatient         PARKAR, [facility]    2as1u0j0-8q 333

caf53-8 [facility]



        00:00:00 00:00:00                 MEHJAKIMBERLY         e2-0ut1-94x bf0-47b7-

b ealt



                                                        0-hg5dyg880 29f-97a580 



                                                        0f4     31461i  

 

        2017 Outpatient         [facility]    117

6133 Access



        00:00:00 00:00:00                 H, PROVIDER                         carlo

[facility]

 

        2017 Outpatient         [facility]    4fq2c2b3-3l

 24565m43-w 

Access



        00:00:00 00:00:00                 H, PROVIDER         ammy-6cz4-24p 98c-49

[facility]



                                                        0-pv9fna178 934-d7ecbb 



                                                        0f4     gev016  

 

        2017 Outpatient         [facility]    117

6132 Access



        00:00:00 00:00:00                 H, PROVIDER                         carlo gibson

 

        2017 Outpatient         [facility]    3wg8x1s4-8u

 37ir3773-x 

Access



        00:00:00 00:00:00                 H, PROVIDER         ammy-1gg2-16d 573-47

40-b ealt



                                                        0-wg7ftq314 ac2-f13dee 



                                                        0f4     73ade5  

 

        2017 Outpatient         [facility]    117

6129 Access



        00:00:00 00:00:00                 H, PROVIDER                         carlo gibson

 

        2017 Outpatient         [facility]    6ez3u2j9-4l

 60r8dv9o-7 

[facility]



        00:00:00 00:00:00                 H, PROVIDER         e2-3ga6-08f b3b-40

64-b ealth



                                                        0-fs7zxv356 3fb-581b64 



                                                        0f4     1e7c2b  

 

        2016 Outpatient         EL, [facility]    6co4n3g2-4q b66

74531-2 [facility]



        00:00:00 00:00:00                 METU         e2-4el6-27a f7z-4473-

8 ealth



                                                        0-pv7mku666 f47-271es6 



                                                        0f4     jj3103  

 

        2016 Outpatient         EL, [facility]    4ni9z7k5-7k 60a

53504-7 [facility]



        14:45:00 14:45:00                 SALMA         e2-1jx7-64g c9q-1412-

b ealth



                                                        0-dx5wdr630 fac-86e620 



                                                        0f4     a3baaa  

 

        2016 Outpatient         [facility]    117

6118 Access



        00:00:00 00:00:00                 H, PROVIDER                         carlo leblanc

 

        2016 Outpatient         [facility]    6yu3n6f6-8m

 nv63f88g-8 

[facility]



        00:00:00 00:00:00                 H, PROVIDER         ammy-4sz7-77d f02-45

a7-a ealt



                                                        0-nt0yrt077 055-gh661m 



                                                        0f4     582994  

 

        2016 Outpatient         [facility]    117

6134 Access



        00:00:00 00:00:00                 H, PROVIDER                         carlo leblanc

 

        2016 Outpatient         [facility]    0bs1d7c6-6o

 [id number] 

[facility]



        00:00:00 00:00:00                 H, PROVIDER         ammy-2ay3-98j 580-4f

2c-b ealt



                                                        0-sy7ecq649 6n3-l5h56n 



                                                        0f4     869c77  

 

        2016 Outpatient         [facility]    117

6114 [facility]



        00:00:00 00:00:00                 H, PROVIDER                         carlo gibson

 

        2016 Outpatient         [facility]    9jj1m4z2-3a

 [id number] 

[facility]



        00:00:00 00:00:00                 H, PROVIDER         e2-3jf9-59z 6e8-45

d3-8 ealth



                                                        0-qm1oiv790 706-edefe6 



                                                        0f4     q1p747  

 

        2016 Outpatient         [facility]    117

6120 [facility]



        00:00:00 00:00:00                 H, PROVIDER                         carlo

[facility]

 

        2016 Outpatient         [facility]    8ns5j9v6-4a

 tb2f864h-t 

[facility]



        00:00:00 00:00:00                 H, PROVIDER         e2-7hp0-58b 1e4-43

f5-9 ealth



                                                        0-vt5xqd021 1x8-1l7o60 



                                                        0f4     b17ec5  

 

        2016 Outpatient         EL, [facility]    6du7v0g0-2s 003

hq97l-2 [facility]



        00:00:00 00:00:00                 MEHJABIN         e2-6dm6-87b 7c6-1v73-

9 ealth



                                                        0-eu1zpd693 24d-179bb1 



                                                        0f4     39037g  

 

        2016 Outpatient         [facility]    117

6125 [facility]



        00:00:00 00:00:00                 H, PROVIDER                         carlo

[facility]

 

        2016 Outpatient         [facility]    9ph7t8m6-1j

 8y8c2y3m-o 

[facility]



        00:00:00 00:00:00                 H, PROVIDER         e2-6dg7-72y 6a0-48

fc-9 ealth



                                                        0-bd6jir135 62a-3i7478 



                                                        0f4     b43779  

 

        2016 Outpatient         EL, [facility]    3wz5m6v1-4g 4a0

fh7k7-3 [facility]



        00:00:00 00:00:00                 MEHJABIN         e2-3mi4-89p k79-221j-

8 ealth



                                                        0-rs1sxw675 ddf-2e8847 



                                                        0f4     9074df  

 

        2016 Outpatient         EL, [facility]    7yi8y3w5-6e ca4

30b91-4 [facility]



        00:00:00 00:00:00                 MEHJABIN         e2-1ov5-47r 329-4cf1-

a ealt



                                                        0-fw0dsp399 b4s-0v95ny 



                                                        0f4     401293  

 

        2016 Outpatient         EL, [facility]    3yb1m7d7-4z 5a1

ik526-3 [facility]



        10:15:00 10:15:00                 MEHJABIN         e2-5ax9-56q i16-8cdf-

8 ealt



                                                        0-iu7rhz354 p30-6891s6 



                                                        0f4     4n3245  

 

        2016 Outpatient         [facility]    117

6119 Access



        00:00:00 00:00:00                 H, PROVIDER                         carlo

[facility]

 

        2016 Outpatient         [facility]    3kq7m3k6-7p

 [id number] 

Access



        00:00:00 00:00:00                 H, PROVIDER         e2-9ga8-04i 698-4b

f2-b [facility]



                                                        0-ed0rrp953 1s5-5x645e 



                                                        0f4     057244  

 

        2015 Outpatient         [facility]    117

6121 Access



        00:00:00 00:00:00                 H, PROVIDER                         carlo

[facility]

 

        2015 Outpatient         [facility]    5ab1b0x9-0h

 p42d91l8-8 

Access



        00:00:00 00:00:00                 H, PROVIDER         e2-0wo3-83i ca1-4f

2a-a ealt



                                                        0-gw7vxj194 35b-3c0fbc 



                                                        0f4     vb580a  

 

        2015 Outpatient         [facility]    117

6126 Access



        00:00:00 00:00:00                 H, PROVIDER                         carlo gibson

 

        2015 Outpatient         [facility]    4jo0n0j0-9v

 13iv7r5l-v 

[facility]



        00:00:00 00:00:00                 H, PROVIDER         e2-7kh7-93z 32d-48

26-a [facility]



                                                        0-dx8loe985 757-e1fe78 



                                                        0f4     e2159e  

 

        2015 Outpatient         [facility]    117

6123 Access



        00:00:00 00:00:00                 H, PROVIDER                         ea

lt

 

        2015 [facility]    6ry0z1x7-0l

 tf700e3j-m 

Access



        00:00:00 00:00:00                 H, PROVIDER         e2-1ka6-93z 922-4b

9c-8 [facility]



                                                        0-jt6pqa236 [facility]-bb52b3 



                                                        0f4     [id number]  







Results







           Test Description Test Time  Test Comments Results    Result Comments 

Source









                    Panel Description: Hemoglobin A1c/Hemoglobin.total in Blood 

2019 09:03:00 









                      Test Item  Value      Reference Range Interpretation Comme

nts









             Hemoglobin A1c (test code = 6.1 %        4.8-5.6      H            

  . Prediabetes: 5.7 - 6.4 Diabetes:

 >6.4



             4548-4)                                             Glycemic contro

l for adults with



                                                                 diabetes: <7.0<

br/><br/>Performed



                                                                 by:<br/>[facility]

 [place] ()<br/><br/>



[facility] Description: Thyrotropin [Units/volume] in Serum or Plasma by 
Detection limit &lt;= 0.05 mIU/-56-53 06:43:00





             Test Item    Value        Reference Range Interpretation Comments

 

             TSH (test code = 00242-4) 4.000 uIU/mL 0.450-4.500               



[facility]

## 2022-08-23 NOTE — ER
Nurse's Notes                                                                                     

 [facility]                                                                 

Name: Liang Jacobs                                                                            

Age: 89 yrs                                                                                       

Sex: Male                                                                                         

: 1933                                                                                   

MRN: [id number]                                                                                   

Arrival Date: 2022                                                                          

Time: 04:30                                                                                       

Account#: [id number]                                                                            

Bed 4                                                                                             

Private MD:                                                                                       

Diagnosis: Lobar pneumonia, unspecified organism-left lower lobe;Hypoxemia;Dementia in other      

  diseases classified elsewhere without behavioral disturbance;Altered mental status,             

  unspecified;Dyspnea;Hypothermia, not associated with low environmental                          

  temperature;Sepsis, unspecified organism;Acute kidney failure, unspecified                      

                                                                                                  

Presentation:                                                                                     

                                                                                             

04:50 Acuity: RAKEL 2                                                                           lp1 

04:51 Chief complaint: Patient's son or daughter states: Reports patient with wheezing,       lp1 

      shortness of breath since last night; low O2 levels at home and low BP. Coronavirus         

      screen: shortness of breath. Ebola Screen: No symptoms or risks identified at this          

      time. Initial Sepsis Screen: Does the patient meet any 2 criteria? RR > 20 per min.         

      Altered Mental Status. HR > 90 bpm. Does the patient have a suspected source of             

      infection? Yes: Productive cough/pneumonia. Risk Assessment: Do you want to hurt            

      yourself or someone else? Patient reports no desire to harm self or others. Onset of        

      symptoms was 2022.                                                               

04:51 Method Of Arrival: Wheelchair                                                           lp1 

                                                                                                  

Triage Assessment:                                                                                

07:01 General: Appears distressed, uncomfortable, ill, Behavior is anxious, restless.         vc1 

      Respiratory: the patient has moderate shortness of breath. Respiratory: Reports cough       

      that is Onset: The symptoms/episode began/occurred gradually. Derm: Skin temperature is     

      cold.                                                                                       

                                                                                                  

Historical:                                                                                       

- Allergies:                                                                                      

04:53 No Known Allergies;                                                                     lp1 

- Home Meds:                                                                                      

04:53 aspirin 81 mg Oral TbEC 1 tab once daily [Active]; clopidogrel 75 mg oral tab 1 tab     lp1 

      once daily [Active]; donepezil 10 mg oral tab 1 tab once daily [Active]; levothyroxine      

      50 mcg tab 1 tab once daily [Active]; Claritin 10 mg Oral tab 1 tab once daily              

      [Active]; pyridostigmine bromide 60 mg oral tab [Active]; Seroquel 25 mg Oral tab           

      [Active]; rosuvastatin 20 mg oral tab 1 tab once daily [Active];                            

- PMHx:                                                                                           

04:53 Myocardial infarction; Hyperlipidemia; Dementia;                                        lp1 

- PSHx:                                                                                           

04:53 Heart stents; Cardiac bypass;                                                           lp1 

                                                                                                  

- Immunization history:: Adult Immunizations up to date.                                          

- Social history:: Smoking status: Patient denies any tobacco usage or history of.                

                                                                                                  

                                                                                                  

Screenin:07 Nutritional screening: No deficits noted. Tuberculosis screening: No symptoms or risk   tw5 

      factors identified. Fall Risk Mental Status-. Sepsis Screening: . Infection: SIRS -         

      Systemic Inflammatory Response Syndrome: 2 or more indicates positive screen: [heart        

      rate greater than 90 beats per minute] [respiratory rate is greater than 20 breaths per     

      minute].                                                                                    

07:00 Abuse screen: Denies threats or abuse.                                                  vc1 

                                                                                                  

Assessment:                                                                                       

05:07 General: Appears distressed, uncomfortable, Behavior is anxious, uncooperative. Neuro:  tw5 

      Level of Consciousness is awake, alert, confused, lethargic. Cardiovascular: Rhythm is      

      irregular. Respiratory: Airway is patent Respiratory effort is labored, Respiratory         

      pattern is tachypnea Breath sounds with crackles bilaterally.                               

07:00 Reassessment: No changes from previously documented assessment. Pain: Unable to use     vc1 

      pain scale. Patient is disoriented. Does not appear to understand pain scale.               

07:55 Reassessment: Patient appears in no apparent distress at this time. No changes from     ph  

      previously documented assessment. Patient and/or family updated on plan of care and         

      expected duration. Pain level reassessed. Pt drowsy but awakens easily to verbal            

      stimuli, BP decreased to 80s/50s, fluid bolus administered.                                 

                                                                                                  

Vital Signs:                                                                                      

04:51  / 97; Pulse 106; Resp 26; Temp 96.8(A); Pulse Ox 86% on R/A; Weight 56.7 kg (R); lp1 

05:36 Pulse 100; Resp 24; Pulse Ox 100% on 2 lpm NC;                                          tw5 

06:30  / 78 (man/);                                                                     vc1 

07:50  / 85; Pulse 106; Resp 18; Temp 100.4(C);                                         ph  

07:56 BP 85 / 54; Pulse 104; Resp 20; Temp 100.7; Pulse Ox 100% on 2 lpm NC;                  ph  

09:00  / 80; Pulse 102; Resp 21; Temp 99.9(C); Pulse Ox 97% on R/A;                     ph  

10:00  / 90; Pulse 93; Resp 20; Temp 99.6(C); Pulse Ox 99% on R/A;                      ph  

                                                                                                  

ED Course:                                                                                        

04:30 Patient arrived in ED.                                                                  ja2 

04:34 Michael Ngo MD is Attending Physician.                                             carloz 

04:50 Triage completed.                                                                       lp1 

04:52 Arm band placed on.                                                                     lp1 

05:06 Liane Foster is Primary Nurse.                                                         tw5 

05:07 Bed in low position. Call light in reach. Side rails up X 1. Side rails up X2. Adult w/ tw5 

      patient.                                                                                    

05:07 No provider procedures requiring assistance completed. Inserted saline lock: 20 gauge   tw5 

      in right antecubital area, using aseptic technique. Blood collected.                        

05:08 XRAY Chest (1 view) In Process Unspecified.                                             EDMS

05:10 Flu Sent.                                                                               tw5 

05:10 SARS RAPID Sent.                                                                        tw5 

05:10 Procalcitonin Sent.                                                                     tw5 

05:10 Lactate Sent.                                                                           tw5 

05:11 Blood Culture Adult (2) Sent.                                                           tw5 

05:21 ROLY Jacobs MD is Hospitalizing Provider.                                                  [facility] 

06:45 Notified ED physician of a critical lab result(s). lac 3.6 troponin 378.3.              tw5 

07:00 Temperature cath inserted.                                                              vc1 

07:57 Primary Nurse role handed off by Liane Foster                                          ph  

07:57 Shellie Castillo, RN is Primary Nurse.                                                    ph  

                                                                                                  

Administered Medications:                                                                         

05:17 Drug: Rocephin (cefTRIAXone) 2 grams Route: IV; Rate: per protocol; Site: right         [facility] 

      antecubital;                                                                                

05:18 Drug: NS 0.9% 500 ml Route: IV; Rate: bolus; Site: right antecubital;                   [facility] 

05:18 Drug: NS 0.9% 1000 ml Route: IV; Rate: 125 ml/hr; Site: right antecubital;              [facility] 

05:18 Drug: Zithromax (azithromycin) 500 mg Route: IVPB; Infused Over: 1 hrs; Site: right     ja4 

      antecubital;                                                                                

05:18 Drug: Pepcid (famotidine) 20 mg Route: IVP; Site: right antecubital;                    [facility] 

07:45 Drug: NS 0.9% 1000 ml Route: IV; Rate: 1 bolus; Site: right antecubital;                  

09:00 Follow up: Response: No adverse reaction; IV Status: Completed infusion; IV Intake:     ph  

      1000ml                                                                                      

08:19 Drug: vancoMYCIN 1 grams Route: IVPB; Infused Over: 2 hrs; Site: right antecubital;     ph  

10:20 Follow up: Response: No adverse reaction; IV Status: Completed infusion; IV Intake:     ph  

      250ml                                                                                       

                                                                                                  

                                                                                                  

Medication:                                                                                       

05:07 VIS not applicable for this client.                                                     tw5 

                                                                                                  

Intake:                                                                                           

09:00 IV: 1000ml; Total: 1000ml.                                                              ph  

10:20 IV: 250ml; Total: 1250ml.                                                               ph  

                                                                                                  

Outcome:                                                                                          

05:22 Decision to Hospitalize by Provider.                                                    [facility] 

13:30 Patient left the ED.                                                                    ss  

                                                                                                  

Signatures:                                                                                       

Dispatcher [employer]                           EDMS                                                 

Michael Ngo MD MD cha Smirch, Shelby, RN                      RN   ss                                                   

Hermila Lovell RN                         RN   lp1                                                  

Shellie Castillo RN                      RN   Daphne Ricardo Tiffany                                5                                                  

Thi Parr RN                    RN   vc1                                                  

Shaka Woodward RN                       RN   ja4                                                  

                                                                                                  

Corrections: (The following items were deleted from the chart)                                    

05:01 04:51  / 97; Pulse 106bpm; Resp 26bpm; Pulse Ox 86% RA; 56.7 kg Reported; lp1     lp1 

                                                                                                  

**************************************************************************************************

## 2022-08-23 NOTE — RAD REPORT
EXAM DESCRIPTION:  RAD - Chest Single View - 8/23/2022 5:06 am

 

CLINICAL HISTORY:  The patient is 89 years old and is Male; Cough

 

TECHNIQUE:  Single view of the chest.

 

COMPARISON:  No relevant prior studies available.

 

FINDINGS:  Lungs:   Left basilar infiltrate suspicious for pneumonia.

        No pulmonary vascular congestion.

        Right lung is clear.

  Pleural space:   Unremarkable.   No pneumothorax.

  Heart:   Small heart size. Status post CABG surgery.

  Mediastinum:   Unremarkable.

  Bones/joints:   Sternal closure wires.

  Upper abdomen:   No free air in the visualized upper abdomen.

 

IMPRESSION:  Left basilar infiltrate suspicious for pneumonia.

 

Electronically signed by:   Kaylene Domingo MD   8/23/2022 5:21 AM CDT Workstation: [id number]

 

 

 

Due to temporary technical issues with the PACS/Fluency reporting system, reports are being signed by
 the in house radiologists without review as a courtesy to insure prompt reporting. The interpreting 
radiologist is fully responsible for the content of the report

## 2022-08-24 LAB
BUN BLD-MCNC: 24 MG/DL (ref 7–18)
GLUCOSE SERPLBLD-MCNC: 128 MG/DL (ref 74–106)
HCT VFR BLD CALC: 34.6 % (ref 39.6–49)
LYMPHOCYTES # SPEC AUTO: 0.7 K/UL (ref 0.7–4.9)
MCV RBC: 90.1 FL (ref 80–100)
PMV BLD: 8.8 FL (ref 7.6–11.3)
POTASSIUM SERPL-SCNC: 4.2 MMOL/L (ref 3.5–5.1)
RBC # BLD: 3.84 M/UL (ref 4.33–5.43)
TROPONIN I SERPL HS-MCNC: (no result) PG/ML (ref ?–58.9)

## 2022-08-24 RX ADMIN — CLOPIDOGREL BISULFATE SCH MG: 75 TABLET, FILM COATED ORAL at 09:31

## 2022-08-24 RX ADMIN — ROSUVASTATIN CALCIUM SCH MG: 10 TABLET, COATED ORAL at 20:11

## 2022-08-24 RX ADMIN — IPRATROPIUM BROMIDE SCH MG: 0.5 SOLUTION RESPIRATORY (INHALATION) at 08:00

## 2022-08-24 RX ADMIN — DONEPEZIL HYDROCHLORIDE SCH MG: 5 TABLET ORAL at 09:36

## 2022-08-24 RX ADMIN — SODIUM CHLORIDE SCH MLS: 9 INJECTION, SOLUTION INTRAVENOUS at 09:33

## 2022-08-24 RX ADMIN — SODIUM CHLORIDE SCH MLS: 0.9 INJECTION, SOLUTION INTRAVENOUS at 05:42

## 2022-08-24 RX ADMIN — IPRATROPIUM BROMIDE SCH MG: 0.5 SOLUTION RESPIRATORY (INHALATION) at 14:00

## 2022-08-24 RX ADMIN — PYRIDOSTIGMINE BROMIDE SCH MG: 60 TABLET ORAL at 09:36

## 2022-08-24 RX ADMIN — Medication SCH ML: at 09:35

## 2022-08-24 RX ADMIN — Medication SCH ML: at 20:12

## 2022-08-24 RX ADMIN — ENOXAPARIN SODIUM SCH MG: 40 INJECTION SUBCUTANEOUS at 09:32

## 2022-08-24 RX ADMIN — DONEPEZIL HYDROCHLORIDE SCH MG: 5 TABLET ORAL at 20:11

## 2022-08-24 RX ADMIN — CEFTRIAXONE SCH MLS: 1 INJECTION, POWDER, FOR SOLUTION INTRAMUSCULAR; INTRAVENOUS at 05:03

## 2022-08-24 RX ADMIN — IPRATROPIUM BROMIDE SCH MG: 0.5 SOLUTION RESPIRATORY (INHALATION) at 01:30

## 2022-08-24 RX ADMIN — SODIUM CHLORIDE SCH MLS: 9 INJECTION, SOLUTION INTRAVENOUS at 17:51

## 2022-08-24 RX ADMIN — QUETIAPINE SCH MG: 25 TABLET ORAL at 20:11

## 2022-08-24 RX ADMIN — IPRATROPIUM BROMIDE SCH MG: 0.5 SOLUTION RESPIRATORY (INHALATION) at 20:40

## 2022-08-24 RX ADMIN — LEVOTHYROXINE SODIUM SCH MG: 0.05 TABLET ORAL at 05:43

## 2022-08-24 RX ADMIN — PYRIDOSTIGMINE BROMIDE SCH MG: 60 TABLET ORAL at 20:11

## 2022-08-24 RX ADMIN — ASPIRIN SCH MG: 81 TABLET, COATED ORAL at 09:34

## 2022-08-24 NOTE — HP
Date of Admission:  08/23/2022



Chief Complaint:  Cough, wheezing.



History Of Present Illness:  This is an 89-year-old male patient with advanced late stage Alzheimer d
isease and dementia, who lives at home with family, was doing fine in his normal usual state of healt
h until last night.  Family member heard some noise coming out of his room, so when they went to [facility] on him, they found him sitting on the commode, feeling very weak and sleepy, and was noted to have 
wheezing.  His oxygen saturation was low around 84% to 85% and systolic blood pressure was around 104
 or so.  With audible wheezing and hypoxia and shortness of breath problem, he was brought to emergen
cy room.  After he was evaluated in the ER, he was admitted to the hospital with left-sided pneumonia
 and acute respiratory failure with hypoxia.



Allergies:  NO KNOWN ALLERGIES.



Medications:  Aspirin 81 mg daily, clopidogrel 75 mg daily, donepezil 10 mg 2 times a day, levothyrox
ine 50 mcg daily, Claritin 10 mg daily, pyridostigmine 60 mg 2 times a day, Seroquel 25 mg at bedtime
, rosuvastatin 20 mg daily at bedtime.  He was on metoprolol, which was discontinued about 3 days ago
 and memantine was also discontinued 3 days ago when he had episode of hypotension with systolic bloo
d pressure around 85 or so at that time and with oral hydration, his blood pressure normalized.



Review of Systems:

CNS:  Has significant impaired cognition to the extent that he is not able to recognize any family me
mber and this is his baseline lately. 

Respiratory:  As mentioned above. 

Constitutional:  As mentioned above.  

Genitourinary:  Has urinary incontinence. 

GI:  Has bowel incontinence. 



All other systems reviewed and negative.



Past Medical History:  Significant for Alzheimer disease which is late stage and dementia, hypertensi
on, hyperlipidemia, coronary artery disease, myasthenia gravis, hypothyroidism, impaired fasting gluc
ose, diverticulosis, chronic kidney disease stage IIIA, benign prostatic hypertrophy.



Past Surgical History:  Coronary artery angioplasty with stent placement in 2016, coronary artery byp
ass surgery in 1995.



Family History:  Brother has Alzheimer disease.



Social History:  Prior history of smoking, but quit more than 30 years ago.  Use of alcohol, rarely e
njoys glass of wine.



Physical Examination:

Vital Signs:  Height 64 inches, weight 155 pounds.  Temperature 93 degree Fahrenheit when he first ca
me into emergency room.  Pulse __________, respiratory rate __________, blood pressure __________, ox
ygen saturation __________. 

General:  The patient appears weaker than normal, not in any respiratory distress at rest. 

HEENT:  Head atraumatic, normocephalic.  Conjunctivae nonerythematous.  Sclerae white.  Mouth, no thr
ush or edema noted.  Ears/Nose, no mass, lesion, discharge noted. 

Neck:  Supple. No JVD, lymph nodes, bruit, thyromegaly noted. 

Lungs:  Presence of wheezing noted in both lung fields with some rales in left lower lung. 

Heart:  Normal heart sounds, no murmur or gallop. 

Abdomen:  Soft, bowel sounds normal. No guarding, rigidity, tenderness, mass, hepatosplenomegaly, dis
tention, or bruit noted. 

Extremities:  No leg edema.  No calf tenderness. 

Skin:  No rash, ulcer, cellulitis. 

Lymphatics:  No lymph node enlargement in neck, supraclavicular, infraclavicular region. 

Neuro:  No focal neurological deficit. 

Chest:  Unremarkable. 

External Genitalia:  Deferred. 

Rectal:  Deferred. 

CNS:  Patient is not oriented to time, place, or person.  No focal neurological deficits.



Laboratory Data:  White count 10.2, hemoglobin 13, platelets 209.  Sodium 138, potassium 4.2, chlorid
e 106, bicarb 22, BUN 24, creatinine 1.95, glucose 161.  Liver function tests unremarkable.  Troponin
 378.  TSH normal.  Procalcitonin 0.48.  Lactic acid 3.6.  Chest x-ray, left lower lobe pneumonia.



Impression:  

1.Pneumonia, community-acquired.

2.Acute respiratory failure with hypoxia.

3.Acute kidney injury.

4.Volume depletion.

5.Anemia, unspecified.

6.Alzheimer disease, late stage.

7.Hypertension.

8.Hyperlipidemia.

9.Coronary artery disease.

10.Senile dementia.

11.Myasthenia gravis.

12.Hypothyroidism.



Plan:  We will go ahead and admit him to hospital for further evaluation and management of this probl
em.  The patient is appropriate for inpatient and is expected to spend 2 midnights in the hospital.  
IV fluid bolus was given in the emergency room and the patient was started on warming blankets as his
 core temperature was low with hypothermia, temperature of 93 degrees Fahrenheit.  Empiric antibiotic
s, ceftriaxone, and azithromycin were started and we will continue maintenance IV fluid after initial
 bolus.  Continue home medications per order.  DVT prophylaxis will be given using Lovenox.  We will 
go ahead and continue his statin therapy, Seroquel at night time, and give nebulizer treatment per or
melvina.  The patient's code status is do not resuscitate and order was written in the chart.





KRISTIN/MODL

DD:  08/23/2022 20:17:00Voice ID:  [id number]

## 2022-08-24 NOTE — RAD REPORT
EXAM DESCRIPTION:  RAD - Chest Single View - 8/24/2022 7:05 pm

 

CLINICAL HISTORY:  pneumonia

 

COMPARISON:  Chest Single View dated 8/24/2022; Chest Single View dated 8/23/2022

 

FINDINGS:  Lines: None.

Lungs: Multifocal interstitial airspace disease. This is similar to earlier in the day.

Pleural: No significant pleural effusions or pneumothorax.

Cardiac: Similar size and configuration. Sternotomy.

Bones: No acute fractures.

Other:

 

IMPRESSION:  Multifocal bilateral airspace disease which may represent pneumonia and is similar to th
e same-day chest radiograph.

## 2022-08-24 NOTE — EKG
Test Date:    2022-08-23               Test Time:    06:41:15

Technician:   ROCAEL                                     

                                                     

MEASUREMENT RESULTS:                                       

Intervals:                                           

Rate:         106                                    

AR:           184                                    

QRSD:         74                                     

QT:           390                                    

QTc:          518                                    

Axis:                                                

P:            51                                     

AR:           184                                    

QRS:          67                                     

T:            20                                     

                                                     

INTERPRETIVE STATEMENTS:                                       

                                                     

Sinus tachycardia with premature supraventricular complexes

ST & T wave abnormality, consider lateral ischemia

Abnormal ECG

No previous ECG available for comparison



Electronically Signed On 08-24-22 06:24:03 CDT by Kev العراقي

## 2022-08-24 NOTE — RAD REPORT
EXAM DESCRIPTION:  RAD - Chest Single View - 8/24/2022 5:38 am

 

CLINICAL HISTORY:  Chest Pain, abnormal chest film

 

COMPARISON:  Portable 08/23/2022

 

TECHNIQUE:  AP portable chest image was obtained 8/24/2022 5:38 am .

 

FINDINGS:  Lung volumes are reduced slightly from prior day imaging. This accentuates the baseline in
terstitial pattern. There does appear to be any interval increase in diffuse interstitial opacificati
on even when adjusting for the lower lung volumes. Left lung field air space opacification is similar
 or slightly worse.

 

Trachea is in the midline. No new tube or line seen. Heart and vasculature are normal. No measurable 
pleural effusion and no pneumothorax. No acute bony abnormality seen. No acute aortic findings suspec
luis antonio.

 

IMPRESSION:  Suspected left lung field pneumonia is similar or slightly worse than prior day imaging.


 

Overall lung field interstitial opacification appears worse even when adjusting for the lower lung vo
lume. This suggests a diffuse interstitial edema or infiltrate.

## 2022-08-25 VITALS — OXYGEN SATURATION: 98 %

## 2022-08-25 VITALS — SYSTOLIC BLOOD PRESSURE: 139 MMHG | DIASTOLIC BLOOD PRESSURE: 82 MMHG

## 2022-08-25 VITALS — TEMPERATURE: 97 F

## 2022-08-25 LAB
BUN BLD-MCNC: 18 MG/DL (ref 7–18)
GLUCOSE SERPLBLD-MCNC: 193 MG/DL (ref 74–106)
HCT VFR BLD CALC: 38.3 % (ref 39.6–49)
LYMPHOCYTES # SPEC AUTO: 0.9 K/UL (ref 0.7–4.9)
MAGNESIUM SERPL-MCNC: 1.5 MG/DL (ref 1.8–2.4)
MCV RBC: 90.3 FL (ref 80–100)
PMV BLD: 8.2 FL (ref 7.6–11.3)
POTASSIUM SERPL-SCNC: 3.5 MMOL/L (ref 3.5–5.1)
RBC # BLD: 4.25 M/UL (ref 4.33–5.43)

## 2022-08-25 RX ADMIN — CLOPIDOGREL BISULFATE SCH MG: 75 TABLET, FILM COATED ORAL at 08:18

## 2022-08-25 RX ADMIN — ASPIRIN SCH MG: 81 TABLET, COATED ORAL at 08:18

## 2022-08-25 RX ADMIN — IPRATROPIUM BROMIDE SCH MG: 0.5 SOLUTION RESPIRATORY (INHALATION) at 07:55

## 2022-08-25 RX ADMIN — LEVOTHYROXINE SODIUM SCH MG: 0.05 TABLET ORAL at 05:50

## 2022-08-25 RX ADMIN — ENOXAPARIN SODIUM SCH MG: 40 INJECTION SUBCUTANEOUS at 08:19

## 2022-08-25 RX ADMIN — PYRIDOSTIGMINE BROMIDE SCH MG: 60 TABLET ORAL at 08:19

## 2022-08-25 RX ADMIN — SODIUM CHLORIDE SCH MLS: 9 INJECTION, SOLUTION INTRAVENOUS at 00:13

## 2022-08-25 RX ADMIN — IPRATROPIUM BROMIDE SCH MG: 0.5 SOLUTION RESPIRATORY (INHALATION) at 13:56

## 2022-08-25 RX ADMIN — Medication SCH ML: at 08:19

## 2022-08-25 RX ADMIN — SODIUM CHLORIDE SCH MLS: 0.9 INJECTION, SOLUTION INTRAVENOUS at 05:50

## 2022-08-25 RX ADMIN — IPRATROPIUM BROMIDE SCH MG: 0.5 SOLUTION RESPIRATORY (INHALATION) at 02:07

## 2022-08-25 RX ADMIN — DONEPEZIL HYDROCHLORIDE SCH MG: 5 TABLET ORAL at 08:18

## 2022-08-25 NOTE — PN
Date of Progress Note:  08/24/2022



Subjective:  The patient was seen this morning for followup.  He had a restless night, but he did get
 some sleep after he received Seroquel last night.  He has not had any problems urinating.  Has had a
 bowel movement, but he does have incontinence of bladder and bowel.  He maintains adequate oxygenati
on on room air and blood pressure is on lower side anywhere between 90 to 110 systolic.



Physical Examination:

HEENT:  Unremarkable. 

Lungs:  Bilateral scattered rales present, not using any accessory muscles of respiration at rest. 

Heart:  Sounds normal. 

Abdomen:  Soft.  Bowel sounds normal.  No guarding, rigidity, tenderness, or distention. 

Extremities:  No leg edema.



Laboratory Data:  White count 14.9, hemoglobin 11.4, platelets 198.  Sodium 140, potassium 4.2, chlor
danyelle 108, bicarb 26, BUN 24, creatinine 1.43, glucose 128.  Troponin 19,363.  Procalcitonin 6.4.  Lact
ic acid 1.7.  Chest x-ray, left lower lobe atelectasis present unchanged and also noticing now diffus
e increased prominence with interstitial prominent markings in both lung fields.



Impression:  

1.Pneumonia.

2.Acute respiratory failure with hypoxia.

3.Non-ST segment elevation myocardial infarction.

4.Coronary artery disease.

5.Senile dementia.

6.Acute kidney injury.

7.Alzheimer disease, end-stage.

8.Acute pulmonary edema.

9.Anemia, unspecified.



Plan:  We will go ahead and continue current azithromycin for pneumonia.  We will stop ceftriaxone an
d start the patient on Zosyn.  He seems to have some trouble swallowing from time to time, which is n
oted in form of coughing episodes and 1 has to keep in mind about possibility of aspiration pneumonia
 and that is why we will __________ aspirin, Plavix, statin therapy.  Echo with Doppler was ordered. 
 Consult Physical Therapy.  Continue current diet and nutritional supplement using Ensure.  During th
e course of day today, he did walk about 50 to 60 feet with Physical Therapy and after that he really
 got tired and slept off and on throughout the day.  He remains completely disoriented and restless a
t times.  Later on during the course of day today, we did repeat another chest x-ray, which continues
 to show similar lung findings as earlier this morning and second dose of Lasix 20 mg IV x1 dose was 
ordered this evening.  His blood pressure remains on the lower side, so higher dose of Lasix will not
 be used.  Overall prognosis is poor.  I have communicated details with Dr. Almanzar and requested him t
o take over the patient's care as of tomorrow.





KRISTIN/MODL

DD:  08/25/2022 06:21:58Voice ID:  [id number]

DT:  08/25/2022 06:54:17Report ID:  [id number]

## 2022-08-25 NOTE — ECHO
HEIGHT: 5 ft 10 in   WEIGHT: 163 lb 11.2 oz   DATE OF STUDY: 08/24/2022   REFER DR: Joshua Jacobs MD

2-DIMENSIONAL: YES

     M.MODE: YES

 DOPPLER: YES

COLOR FLOW: YES



                    TDS:  NO

PORTABLE: YES

 DEFINITY:  NO

BUBBLE STUDY: NO





DIAGNOSIS:  NSTEMI



CARDIAC HISTORY:  

CATHERIZATION: 

SURGERY: 

PROSTHETIC VALVE: 

PACEMAKER: 





MEASUREMENTS (cm)

    DIASTOLIC (NORMALS)                 SYSTOLIC (NORMALS)

IVSd                 0.8 (0.6-1.2)                    LA Diam 3.4 (1.9-4.0)     LVEF       
  58%  

LVIDd               4.9 (3.5-5.7)                        LVIDs      3.4 (2.0-3.5)     %FS  
        31%

LVPWd             1.0 (0.6-1.2)

Ao Diam           3.0 (2.0-3.7)



2 DIMENSIONAL ASSESSMENT:

RIGHT ATRIUM:                   NORMAL

LEFT ATRIUM:       NORMAL



RIGHT VENTRICLE:            NORMAL

LEFT VENTRICLE: NORMAL



TRICUSPID VALVE:             

MITRAL VALVE:  MITRAL ANNULAR CALCIFICATION



PULMONIC VALVE:             NORML

AORTIC VALVE:     



PERICARDIAL EFFUSION: NONE

AORTIC ROOT:      NORMAL





LEFT VENTRICULAR WALL MOTION:     NORMAL



DOPPLER/COLOR FLOW:     SEE BELOW.



COMMENTS:      NORMAL LEFT VENTRICULAR EJECTION FRACTION 55-60% WITH NORMAL WALL MOTION. 
MILD AORTIC AND TRICUSPID REGURGITATION. AORTIC VALVE CALCIFICATION WITH NO AORTIC 
STENOSIS.



TECHNOLOGIST:   ANTHONY GREWAL

## 2022-08-25 NOTE — RAD REPORT
EXAM DESCRIPTION:  RAD - Chest Single View - 8/25/2022 8:24 am

 

CLINICAL HISTORY:  pneumonia, pulm edema

 

COMPARISON:  Portable August 24, portable August 23

 

TECHNIQUE:  AP portable chest image was obtained 8/25/2022 8:24 am .

 

FINDINGS:  Baseline prominent interstitial pattern again noted. The bilateral interstitial and alveol
ar opacities detailed on prior examinations have improved. No new or progressive lung parenchymal fin
ding.

 

Sternotomy wires again noted. No new tube or line. Heart and vasculature are normal. No measurable pl
eural effusion and no pneumothorax. No acute bony abnormality seen. No acute aortic findings suspecte
d.

 

IMPRESSION:  Significant clearing of the bilateral interstitial and alveolar opacities from prior day
 imaging.

 

No new, progressive finding.

## 2022-08-25 NOTE — P.DS
Admission Date: 08/23/22


Discharge Date: 08/25/22


Disposition: HOSPICE-HOME


Discharge Condition: SERIOUS





- Problems


(1) Septic shock


Status: Acute   





(2) Bacterial pneumonia


Status: Acute   





(3) Cardiac ischemia


Status: Acute   





(4) Prerenal azotemia


Status: Acute   





(5) Alzheimer disease


Status: Acute   


Hospital Course: 





MR. ELVIA FIELDS IS FATHER OF DR. FRANCOISE FIELDS.  HE HAS SEVERE DEMENTIA WITH PARKISONOID

FEATURES, DEVELOPED SEVERE WEAKNESS, HYPOTENSION AND COULD NOT GET OFF COMMODE. 

PATIENT WAS BROUGHT TO THIS HOSPITAL AND WAS FOUND TO HAVE PNEUMONIA, SEPTIC 

SHOCK,  PRRENAL AZOETEMIA, PROCALCITONIN WAS HIGH AND HE FURTHER ADMITTED TO 

HOSPITAL.  DR. FIELDS ASKED ME TO SEE HIM FOR SECOND OPINION ABOUT HIS STATUS AND 

ADVISE ON DISPOSITION.  I HAD A LONG DISCUSSION WITH DR. FIELDS AND HIS BROTHER 

THE ONLY OFFSPRINGS OF MR. FIELDS.  THEY BOTH AGREE AND WANT FATHER COMFORTABLE 

WITHOUT ANY AGGRESSIVE INTERVENTIONS PER HIS WISH WHEN HE WAS LUCID.  HE HAS NOT

RESPONDED WELL TO ANTIBIOTICS, HE ALSO HAD CARDIAC ISCHEMIA WITH MI RELATED TO 

HYPOTENSION.  THEY WANT HIM ON HOSPICE  AT HOME AND I AGREE.  HE IS SENT HOME 

WITH HOSPICE COMPANY IN COMFORTABLE CONDITION. MR. MAYNOR FIELDS HAS NO IDEA ABOUT HIS

CONIDTION AND HE IS DISORIENTED AT BASELINE. 


Vital Signs/Physical Exam: 














Temp Pulse Resp BP Pulse Ox


 


 97 F   92 H  19   139/82   94 


 


 08/25/22 08:00  08/25/22 08:00  08/25/22 08:00  08/25/22 08:00  08/24/22 08:00








Laboratory Data at Discharge: 














WBC  17.30 K/uL (4.3-10.9)  H D 08/25/22  07:55    


 


Hgb  12.7 g/dL (13.6-17.9)  L  08/25/22  07:55    


 


Hct  38.3 % (39.6-49.0)  L  08/25/22  07:55    


 


Plt Count  224 K/uL (152-406)   08/25/22  07:55    


 


PT  11.5 SECONDS (9.5-12.5)   08/23/22  06:00    


 


INR  1.04   08/23/22  06:00    


 


Sodium  136 mmol/L (136-145)   08/25/22  07:55    


 


Potassium  3.5 mmol/L (3.5-5.1)   08/25/22  07:55    


 


BUN  18 mg/dL (7-18)   08/25/22  07:55    


 


Creatinine  1.16 mg/dL (0.55-1.3)   08/25/22  07:55    


 


Glucose  193 mg/dL ()  H  08/25/22  07:55    


 


Magnesium  1.5 mg/dL (1.8-2.4)  L  08/25/22  07:55    


 


Total Bilirubin  0.3 mg/dL (0.2-1.0)   08/23/22  06:00    


 


AST  19 U/L (15-37)   08/23/22  06:00    


 


ALT  15 U/L (12-78)   08/23/22  06:00    


 


Alkaline Phosphatase  47 U/L ()   08/23/22  06:00    








Home Medications: 








Aspirin [Aspirin EC] 81 mg PO DAILY 08/23/22 


Clopidogrel Bisulfate [Plavix] 75 mg PO DAILY 08/23/22 


Donepezil HCl 10 mg PO DAILY 08/23/22 


Levothyroxine [Synthroid*] 50 mcg PO [id number] 08/23/22 


Loratadine [Claritin] 10 mg PO DAILY 08/23/22 


Pyridostigmine Bromide 60 mg PO QID 08/23/22 


Quetiapine Fumarate [Seroquel] 25 mg PO BEDTIME 08/23/22 


Rosuvastatin Calcium 20 mg PO DAILY 08/23/22 





Followup: 


Joshua Fields MD [Primary Care Provider] - 


Dewey Almanzar MD [ACTIVE - CAN ADMIT] -

## 2022-08-30 NOTE — EKG
Test Date:    2022-08-24               Test Time:    16:38:27

Technician:   [email or web address]                                   

                                                     

MEASUREMENT RESULTS:                                       

Intervals:                                           

Rate:         81                                     

VA:           188                                    

QRSD:         74                                     

QT:           350                                    

QTc:          406                                    

Axis:                                                

P:            34                                     

VA:           188                                    

QRS:          42                                     

T:            226                                    

                                                     

INTERPRETIVE STATEMENTS:                                       

                                                     

Sinus rhythm with premature supraventricular complexes

ST & T wave abnormality, consider lateral ischemia

Abnormal ECG

Compared to ECG 08/23/2022 06:41:15

Sinus tachycardia no longer present

ST (T wave) deviation still present

Possible ischemia still present



Electronically Signed On 08-30-22 08:12:47 CDT by Kev العراقي